# Patient Record
Sex: FEMALE | Race: WHITE | Employment: UNEMPLOYED | URBAN - METROPOLITAN AREA
[De-identification: names, ages, dates, MRNs, and addresses within clinical notes are randomized per-mention and may not be internally consistent; named-entity substitution may affect disease eponyms.]

---

## 2018-10-22 PROBLEM — E66.01 SEVERE OBESITY (HCC): Status: ACTIVE | Noted: 2018-10-22

## 2019-03-27 PROBLEM — I10 ESSENTIAL HYPERTENSION: Status: ACTIVE | Noted: 2019-03-27

## 2022-01-13 ENCOUNTER — E-VISIT (OUTPATIENT)
Dept: OTHER | Age: 27
End: 2022-01-13

## 2022-01-13 DIAGNOSIS — J01.90 ACUTE SINUSITIS, RECURRENCE NOT SPECIFIED, UNSPECIFIED LOCATION: ICD-10-CM

## 2022-01-13 DIAGNOSIS — J01.00 ACUTE NON-RECURRENT MAXILLARY SINUSITIS: Primary | ICD-10-CM

## 2022-01-17 NOTE — PROGRESS NOTES
Telly Kirkland (1995) initiated an asynchronous digital communication through [employer]. HPI: per patient questionnaire     Exam: not applicable    Diagnoses and all orders for this visit:  Diagnoses and all orders for this visit:    1. Acute non-recurrent maxillary sinusitis    2. Acute sinusitis, recurrence not specified, unspecified location        Orders Placed This Encounter    cefdinir (OMNICEF) 300 mg capsule     Sig: Take 2 Capsules by mouth daily for 10 days. Dispense:  20 Capsule     Refill:  0     Time: EV1 - 5-10 minutes were spent on the digital evaluation and management of this patient.       Chandrakant Hernandes MD

## 2022-03-19 PROBLEM — E66.01 SEVERE OBESITY (HCC): Status: ACTIVE | Noted: 2018-10-22

## 2022-03-22 PROBLEM — R60.9 EDEMA: Status: ACTIVE | Noted: 2022-03-22

## 2022-03-22 PROBLEM — L72.0 EIC (EPIDERMAL INCLUSION CYST): Status: ACTIVE | Noted: 2022-03-22

## 2022-03-24 PROBLEM — R60.9 EDEMA: Status: ACTIVE | Noted: 2022-03-22

## 2022-08-13 DIAGNOSIS — I10 ESSENTIAL (PRIMARY) HYPERTENSION: ICD-10-CM

## 2022-08-14 DIAGNOSIS — F41.9 ANXIETY DISORDER, UNSPECIFIED: ICD-10-CM

## 2022-08-15 RX ORDER — METOPROLOL SUCCINATE 25 MG/1
TABLET, EXTENDED RELEASE ORAL
Qty: 90 TABLET | Refills: 1 | Status: SHIPPED | OUTPATIENT
Start: 2022-08-15